# Patient Record
Sex: FEMALE | Race: WHITE | NOT HISPANIC OR LATINO
[De-identification: names, ages, dates, MRNs, and addresses within clinical notes are randomized per-mention and may not be internally consistent; named-entity substitution may affect disease eponyms.]

---

## 2024-02-06 PROBLEM — Z00.00 ENCOUNTER FOR PREVENTIVE HEALTH EXAMINATION: Status: ACTIVE | Noted: 2024-02-06

## 2024-02-08 ENCOUNTER — APPOINTMENT (OUTPATIENT)
Dept: GYNECOLOGIC ONCOLOGY | Facility: CLINIC | Age: 69
End: 2024-02-08
Payer: MEDICARE

## 2024-02-08 VITALS
TEMPERATURE: 98 F | WEIGHT: 185 LBS | BODY MASS INDEX: 34.04 KG/M2 | HEART RATE: 73 BPM | OXYGEN SATURATION: 95 % | RESPIRATION RATE: 18 BRPM | HEIGHT: 62 IN | SYSTOLIC BLOOD PRESSURE: 122 MMHG | DIASTOLIC BLOOD PRESSURE: 82 MMHG

## 2024-02-08 DIAGNOSIS — Q60.0 RENAL AGENESIS, UNILATERAL: ICD-10-CM

## 2024-02-08 DIAGNOSIS — Z78.9 OTHER SPECIFIED HEALTH STATUS: ICD-10-CM

## 2024-02-08 DIAGNOSIS — Z80.9 FAMILY HISTORY OF MALIGNANT NEOPLASM, UNSPECIFIED: ICD-10-CM

## 2024-02-08 DIAGNOSIS — C56.9 MALIGNANT NEOPLASM OF UNSPECIFIED OVARY: ICD-10-CM

## 2024-02-08 DIAGNOSIS — Z85.3 PERSONAL HISTORY OF MALIGNANT NEOPLASM OF BREAST: ICD-10-CM

## 2024-02-08 DIAGNOSIS — Z86.39 PERSONAL HISTORY OF OTHER ENDOCRINE, NUTRITIONAL AND METABOLIC DISEASE: ICD-10-CM

## 2024-02-08 DIAGNOSIS — Z87.898 PERSONAL HISTORY OF OTHER SPECIFIED CONDITIONS: ICD-10-CM

## 2024-02-08 PROCEDURE — 99213 OFFICE O/P EST LOW 20 MIN: CPT

## 2024-02-08 PROCEDURE — 99203 OFFICE O/P NEW LOW 30 MIN: CPT

## 2024-02-08 RX ORDER — METOPROLOL SUCCINATE 25 MG/1
25 TABLET, EXTENDED RELEASE ORAL
Refills: 0 | Status: ACTIVE | COMMUNITY

## 2024-02-08 RX ORDER — ERGOCALCIFEROL 1.25 MG/1
1.25 MG CAPSULE ORAL
Refills: 0 | Status: ACTIVE | COMMUNITY

## 2024-02-08 RX ORDER — MAGNESIUM OXIDE 400 MG/1
400 TABLET ORAL
Refills: 0 | Status: ACTIVE | COMMUNITY

## 2024-02-08 NOTE — ASSESSMENT
[FreeTextEntry1] : Patient had a long history of persistent ovarian cancer since 2021. She has been on 3 different regimen  of treatment after completing her surgery  Her most recent CT scan indicated that she is possibly without any evidence of disease or at least had a significant response to her most recent treatment.  The options of management was presented to her by my colleague Dr. Tucker.  I explained to the patient that my inclinatiion would be to consider observation with follow-up examinations and blood testing or just bevacizumab maintenance therapy.  She inquired about the length of bevacizumab.  I think that she will most likely continue to receive it until the progression of disease or she no longer tolerate her treatment.  So far she seems that she has done well while receiving the combinations of Cytoxan and bevacizumab.  I answered her questions.  I encouraged her to go and live her life. She is welcome to return to the office if she has any further questions.  25 minutes of face-to-face encounter today .

## 2024-02-08 NOTE — HISTORY OF PRESENT ILLNESS
[FreeTextEntry1] : The patient is well-known to me from my previous position at [facility].  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  Patient has received multiple adjuvant treatment including carboplatin and paclitaxel.  After noting a recurrence in October 2022 patient was placed on carboplatin and Doxil with progression of disease noted at the time.  The patient was then transition to a third line treatment of Cytoxan and bevacizumab.  At a interval imaging evaluation was concern of progression of disease.  She was then initiated and screening for a phase 1 trial of H ST 1011.  The patient underwent a biopsy on January 25, 2024.  Dr. Casey attempted the biopsy of this vaginal cuff mass which turned out to be a collection of benign fluid.  No malignant cells was noted at that time of the procedure.  The final report confirmed that.  Given this finding the patient does not have any measurable disease based on RECIST criteria.  Dr. Tucker reviewed the options of treatment with her.  These include continuing Cytoxan and bevacizumab, bevacizumab maintenance, or surveillance evaluation only without treatment.  The patient opted to come here to discuss further with me since I was very familiar with her disease up to June 2024 when I departed the institution.  Her most recent CT scan on December 26, 2023 showed 1 status post DARYL/BSO soft tissue density distending the vaginal cuff new when compared to 7/27/2023 and higher intensity when compared to 9/18/2023, which would be better evaluated clinically to exclude the possibility of tumor To decrease in size of a focus of right pelvic carcinomatosis when compared to 9/18/2023.  A tiny right pericolonic focus of carcinomatosis is either unchanged or also smaller. 3 stable scattered micronodules within the lungs For status post bilateral mastectomy with bilateral breast reconstruction no bowel obstruction  Oncology history Fallopian tube carcinoma, left 7/16/2021 initial diagnosis Pelvic mass noted concerning for malignancy mass in the left ovary  9/16/2021 surgery Robotic assisted laparoscopic total laparoscopic hysterectomy bilateral salpingo-oophorectomy pelvic and para-aortic lymph node dissection omentectomy Frozen section was positive for malignancy Left adnexal mass high-grade serous carcinoma Uterus right fallopian tube and ovary hysterectomy right salpingo-oophorectomy: High-grade serous carcinoma of right fallopian tube with involvement of bilateral ovaries and left paratubal soft tissue 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 fallopian tube Endometrium with cystic atrophy Cervix with no significant histopathology Left ovary and fallopian tube high-grade serous carcinoma (9.0 cm)  Sigmoid nodule high-grade serous carcinoma Right para-aortic lymph node 0/1 Left para-aortic lymph node 0/3 Left pelvic lymph node 0/8 Right pelvic lymph node 0/20  Omentum benign adipose tissue Posterior cul-de-sac nodule excision high-grade serous carcinoma Patient has a stage IIb  fallopian tube carcinoma   10/15/2021 to 1/28/2022   chemotherapy  paclitaxel and carboplatin  The patient developed significant skin reaction from the Taxol in the first cycle of treatment she received extra doses of steroids and the subsequent treatment.  10/18/2021 genetic testing was negative  10/20/2022 to 3/9/2023      chemotherapy Carboplatin and Doxil  Progression of disease was noted  5/5/2023 to December 5, 2023 bevacizumab and oral Cytoxan

## 2024-02-08 NOTE — REASON FOR VISIT
[Family Member] : family member [Other: _____] : [unfilled] [FreeTextEntry1] : here for a second opinion regarding her treatment

## 2024-02-08 NOTE — LETTER BODY
[Attached please find my note.] : Attached please find my note. [FreeTextEntry2] : Dr Genna Tucker   Dear Genna   Ms Rekha Bennett  was seen in my office for a consultation regarding status of her falopian tube cancer.  Please see my consult note for her plan of care.  Thank you for allowing me to participate in the care of this patient.    Please do not hesitate to call if you have any questions.    Most Sincerely,      Chapincito Smallwood MD [facility] Director, [facility] Division of Gynecologic Oncology Department Obstetrics and Gynecology Tel [phone] [email or web address]  Clear bilaterally, pupils equal, round and reactive to light.